# Patient Record
Sex: FEMALE | Race: WHITE | NOT HISPANIC OR LATINO | RURAL
[De-identification: names, ages, dates, MRNs, and addresses within clinical notes are randomized per-mention and may not be internally consistent; named-entity substitution may affect disease eponyms.]

---

## 2023-06-29 DIAGNOSIS — L57.0 ACTINIC KERATOSIS: Primary | ICD-10-CM

## 2023-08-15 ENCOUNTER — OFFICE VISIT (OUTPATIENT)
Dept: DERMATOLOGY | Facility: CLINIC | Age: 83
End: 2023-08-15
Payer: MEDICARE

## 2023-08-15 VITALS — BODY MASS INDEX: 19.83 KG/M2 | HEIGHT: 61 IN | WEIGHT: 105 LBS

## 2023-08-15 DIAGNOSIS — L57.0 ACTINIC KERATOSIS: ICD-10-CM

## 2023-08-15 DIAGNOSIS — L57.8 OTHER SKIN CHANGES DUE TO CHRONIC EXPOSURE TO NONIONIZING RADIATION: Primary | ICD-10-CM

## 2023-08-15 DIAGNOSIS — Z85.828 HISTORY OF NONMELANOMA SKIN CANCER: ICD-10-CM

## 2023-08-15 DIAGNOSIS — L82.1 SEBORRHEIC KERATOSES: ICD-10-CM

## 2023-08-15 DIAGNOSIS — D48.9 NEOPLASM OF UNCERTAIN BEHAVIOR: ICD-10-CM

## 2023-08-15 PROCEDURE — 88305 PATHOLOGY, DERMATOLOGY: ICD-10-PCS | Mod: 26,,, | Performed by: PATHOLOGY

## 2023-08-15 PROCEDURE — 17000 DESTRUCT PREMALG LESION: CPT | Mod: XS,,, | Performed by: DERMATOLOGY

## 2023-08-15 PROCEDURE — 88305 TISSUE EXAM BY PATHOLOGIST: CPT | Mod: TC,SUR | Performed by: DERMATOLOGY

## 2023-08-15 PROCEDURE — 17000 PR DESTRUCTION(LASER SURGERY,CRYOSURGERY,CHEMOSURGERY),PREMALIGNANT LESIONS,FIRST LESION: ICD-10-PCS | Mod: XS,,, | Performed by: DERMATOLOGY

## 2023-08-15 PROCEDURE — 17003 DESTRUCT PREMALG LES 2-14: CPT | Mod: ,,, | Performed by: DERMATOLOGY

## 2023-08-15 PROCEDURE — 88305 TISSUE EXAM BY PATHOLOGIST: CPT | Mod: 26,,, | Performed by: PATHOLOGY

## 2023-08-15 PROCEDURE — 99203 OFFICE O/P NEW LOW 30 MIN: CPT | Mod: 25,,, | Performed by: DERMATOLOGY

## 2023-08-15 PROCEDURE — 17003 DESTRUCTION, PREMALIGNANT LESIONS; SECOND THROUGH 14 LESIONS: ICD-10-PCS | Mod: ,,, | Performed by: DERMATOLOGY

## 2023-08-15 PROCEDURE — 99203 PR OFFICE/OUTPT VISIT, NEW, LEVL III, 30-44 MIN: ICD-10-PCS | Mod: 25,,, | Performed by: DERMATOLOGY

## 2023-08-15 PROCEDURE — 11102 TANGNTL BX SKIN SINGLE LES: CPT | Mod: ,,, | Performed by: DERMATOLOGY

## 2023-08-15 PROCEDURE — 11102 PR TANGENTIAL BIOPSY, SKIN, SINGLE LESION: ICD-10-PCS | Mod: ,,, | Performed by: DERMATOLOGY

## 2023-08-15 RX ORDER — LEVOTHYROXINE SODIUM 50 UG/1
TABLET ORAL
COMMUNITY
Start: 2023-07-27

## 2023-08-15 RX ORDER — FAMOTIDINE 20 MG/1
TABLET, FILM COATED ORAL
COMMUNITY
Start: 2023-07-27

## 2023-08-15 RX ORDER — SIMVASTATIN 20 MG/1
TABLET, FILM COATED ORAL
COMMUNITY
Start: 2023-07-27

## 2023-08-15 NOTE — PROGRESS NOTES
[facility]   Olya Ulloa MD    Patient Name: Heena Lai  Patient YOB: 1940   Date of Service: 8/15/23    CC: Full Skin Exam    HPI: Heena Lai is a 82 y.o. female presents today for a full skin exam.  Patient has been seen by a dermatologist in the past and dermatologic history includes SCC and BCC, October 2022. These excisions are the most recent but patient reports having extensive NMSC history. Patient is concerned today about a lesion located on the right upper anterior leg.  It has been present for 2 month(s). It has not been treated in the past.      History reviewed. No pertinent past medical history.  History reviewed. No pertinent surgical history.  Review of patient's allergies indicates:  No Known Allergies    Current Outpatient Medications:     famotidine (PEPCID) 20 MG tablet, Famotidine 20 MG Oral Tablet QTY: 180 tablet Days: 90 Refills: 2  Written: 07/27/23 Patient Instructions: twice a day, Disp: , Rfl:     levothyroxine (SYNTHROID) 50 MCG tablet, Synthroid 50 MCG Oral Tablet QTY: 90 tablet Days: 90 Refills: 2  Written: 07/27/23 Patient Instructions: once a day, Disp: , Rfl:     simvastatin (ZOCOR) 20 MG tablet, Simvastatin 20 MG Oral Tablet QTY: 90 tablet Days: 90 Refills: 2  Written: 07/27/23 Patient Instructions: once a day, Disp: , Rfl:     ROS: A focused review of systems was obtained and negative.     Exam: A full skin exam was performed including scalp, hair, face, neck, chest, back, abdomen, right arm, left arm, right hand, left hand, nails, right leg, and left leg.  All areas examined were normal expect as per below in assessment and plan.  General Appearance of the patient is well developed and well nourished.  Orientation: alert and oriented x 3.  Mood and affect: pleasant.    Assessment:   The primary encounter diagnosis was Other skin changes due to chronic exposure to nonionizing radiation. Diagnoses of Actinic keratosis, History of nonmelanoma skin  cancer, Neoplasm of uncertain behavior, and Seborrheic keratoses were also pertinent to this visit.    Plan:      Actinic Keratoses(L57.0)  - Erythematous patches and papules with hyperkeratotic scale distributed on the right arm, left arm, right leg, and left leg.    Plan: Counseling.  I counseled the patient regarding the following:  Skin Care: Sun protective clothing and broad spectrum sunscreen can prevent the formation of Actinic  Keratoses. AKs can resolve with cryotherapy, photodynamic therapy, imiquimod, topical 5-FU.  Expectations: Actinic Keratoses are precancerous proliferations that occur within sun damaged skin. If untreated,  a small subset of AKs can develop into Squamous Cell Carcinoma.  Contact Office if: If AKs fail to resolve despite treatment, or if you develop a side effect from therapy, such as  unbearable crusting, scabbing, redness and tenderness.    Plan: Liquid Nitrogen.  A total of 4 lesions were treated with liquid nitrogen for 2 freeze-thaw cycles lasting 5 seconds, located on the above locations.   The patient's consent was obtained including but not limited to risks of crusting, scabbing,  blistering, scarring, darker or lighter pigmentary change, recurrence, incomplete removal and infection.    Seborrheic Keratosis (L82.1)  - Stuck-on, warty, greasy brown papule with pseudo-horn cysts scattered on the trunk and extremities    Plan: Counseling.  I counseled the patient regarding the following:  Skin Care: Seborrheic Keratoses are benign. No treatment is necessary.  Expectations: Seborrheic Keratoses are benign warty growths. Patients get more of them as they age    Plan: Reassurance    Neoplasm of Uncertain Behavior (D48.5)  - eroded pink papule located on the right medial knee  Ddx includes: BCC vs SCC    Plan: Biopsy by Shave Method.  Location (1): right medial knee  Written consent was obtained and risks were reviewed including but not  limited to scarring, infection, bleeding,  scabbing, incomplete removal, nerve damage and allergy to anesthesia.  The area was prepped with Chloraprep. Local anesthesia was obtained with approximately 0.5cc of 1% lidocaine  with epinephrine. A biopsy by shave method to the level of the dermis (sent for H and E) was performed using  a Dermablade on the above location. Aluminum chloride was used for hemostasis. Following the biopsy  Petrolatum and a bandage were applied. Patient will be notified of biopsy results. However, patient instructed to  call the office if not contacted within 2 weeks.    Plan: Counseling.  I counseled the patient regarding the following:  Instructions: Neoplasms of Uncertain Behavior can be observed, biopsied or surgically removed depending on the  level of clinical suspicion.  Instructions: Neoplasms of Uncertain Behavior can be observed, biopsied or surgically removed depending on the  level of clinical suspicion.  Contact Office if: patient develops any new lesions that fail to heal, ulcerate or bleed.    History of non-melanoma skin cancer (Z85.828)  - Well healed scar with NER  Associated diagnosis: Medical surveillance following completed treatment    Plan: Monitoring.    Other Skin Changes Due to Chronic Exposure of Nonionizing Radiation (L57.8)    Plan: Monitoring.     Plan: Sunscreen Recommendations.  I recommended a broad spectrum sunscreen with a SPF of 30 or higher. I explained that SPF 30 sunscreens block approximately 97 percent of the  sun's harmful rays. Sunscreens should be applied at least 15 minutes prior to expected sun exposure and then every 2 hours after that as long as  sun exposure continues. If swimming or exercising sunscreen should be reapplied every 45 minutes to an hour after getting wet or sweating. One  ounce, or the equivalent of a shot glass full of sunscreen, is adequate to protect the skin not covered by a bathing suit. I also recommended a lip  balm with a sunscreen as well. Sun protective clothing  can be used in lieu of sunscreen but must be worn the entire time you are exposed to the  sun's rays.    Follow up in about 6 months (around 2/15/2024) for FSE.    Olya Ulloa MD

## 2023-08-15 NOTE — PATIENT INSTRUCTIONS
Cryotherapy  There will likely be a blister.   Clean the area daily with dial antibacterial soap and water.   Apply vaseline as needed.   The area will take 1-2 weeks to heal.    Biopsy Site Care  Starting tomorrow you may shower and wash the area with dial antibacterial soap  Pat dry and apply vaseline and a bandaid  Perform this routine for three days  The area will be irritated, sore, slightly red, and may itch, sting, or burn  Do not apply make-up to the area until it is healed  There will be a scar  The area will take 1-2 weeks to heal  No soaking in the tub or hot tub for one week

## 2023-08-17 LAB
ESTROGEN SERPL-MCNC: NORMAL PG/ML
INSULIN SERPL-ACNC: NORMAL U[IU]/ML
LAB AP GROSS DESCRIPTION: NORMAL
LAB AP LABORATORY NOTES: NORMAL
LAB AP SPEC A DDX: NORMAL
LAB AP SPEC A MORPHOLOGY: NORMAL
LAB AP SPEC A PROCEDURE: NORMAL
T3RU NFR SERPL: NORMAL %

## 2023-09-08 ENCOUNTER — PROCEDURE VISIT (OUTPATIENT)
Dept: DERMATOLOGY | Facility: CLINIC | Age: 83
End: 2023-09-08
Payer: MEDICARE

## 2023-09-08 VITALS — SYSTOLIC BLOOD PRESSURE: 140 MMHG | DIASTOLIC BLOOD PRESSURE: 83 MMHG | HEART RATE: 81 BPM

## 2023-09-08 DIAGNOSIS — Z91.89 AT HIGH RISK FOR INFECTION AT SURGICAL SITE: ICD-10-CM

## 2023-09-08 DIAGNOSIS — C44.712 BASAL CELL CARCINOMA (BCC) OF RIGHT KNEE: Primary | ICD-10-CM

## 2023-09-08 PROCEDURE — 12032 PR LAYR CLOS WND TRUNK,ARM,LEG 2.6-7.5 CM: ICD-10-PCS | Mod: 51,,, | Performed by: DERMATOLOGY

## 2023-09-08 PROCEDURE — 99499 NO LOS: ICD-10-PCS | Mod: ,,, | Performed by: DERMATOLOGY

## 2023-09-08 PROCEDURE — 88305 TISSUE EXAM BY PATHOLOGIST: CPT | Mod: 26,,, | Performed by: PATHOLOGY

## 2023-09-08 PROCEDURE — 99499 UNLISTED E&M SERVICE: CPT | Mod: ,,, | Performed by: DERMATOLOGY

## 2023-09-08 PROCEDURE — 11602 PR EXC SKIN MALIG 1.1-2 CM TRUNK,ARM,LEG: ICD-10-PCS | Mod: ,,, | Performed by: DERMATOLOGY

## 2023-09-08 PROCEDURE — 88305 TISSUE EXAM BY PATHOLOGIST: CPT | Mod: TC,SUR | Performed by: DERMATOLOGY

## 2023-09-08 PROCEDURE — 11602 EXC TR-EXT MAL+MARG 1.1-2 CM: CPT | Mod: ,,, | Performed by: DERMATOLOGY

## 2023-09-08 PROCEDURE — 12032 INTMD RPR S/A/T/EXT 2.6-7.5: CPT | Mod: 51,,, | Performed by: DERMATOLOGY

## 2023-09-08 PROCEDURE — 88305 PATHOLOGY, DERMATOLOGY: ICD-10-PCS | Mod: 26,,, | Performed by: PATHOLOGY

## 2023-09-08 RX ORDER — MUPIROCIN 20 MG/G
OINTMENT TOPICAL
Qty: 30 G | Refills: 3 | Status: SHIPPED | OUTPATIENT
Start: 2023-09-08

## 2023-09-08 RX ORDER — CEPHALEXIN 500 MG/1
500 CAPSULE ORAL 2 TIMES DAILY
Qty: 10 CAPSULE | Refills: 0 | Status: SHIPPED | OUTPATIENT
Start: 2023-09-08 | End: 2023-09-13

## 2023-09-08 NOTE — PROGRESS NOTES
[facility]   Olya Ulloa MD    Patient Name: Heena Lai  Patient YOB: 1940   Date of Service: 9/8/23    CC: Excision    HPI: Heena Lai is a 82 y.o. female here today for excision of BCC, located on the right medial knee.      No past medical history on file.  No past surgical history on file.  Review of patient's allergies indicates:  No Known Allergies    Current Outpatient Medications:     famotidine (PEPCID) 20 MG tablet, Famotidine 20 MG Oral Tablet QTY: 180 tablet Days: 90 Refills: 2  Written: 07/27/23 Patient Instructions: twice a day, Disp: , Rfl:     levothyroxine (SYNTHROID) 50 MCG tablet, Synthroid 50 MCG Oral Tablet QTY: 90 tablet Days: 90 Refills: 2  Written: 07/27/23 Patient Instructions: once a day, Disp: , Rfl:     simvastatin (ZOCOR) 20 MG tablet, Simvastatin 20 MG Oral Tablet QTY: 90 tablet Days: 90 Refills: 2  Written: 07/27/23 Patient Instructions: once a day, Disp: , Rfl:     ROS: A focused review of systems was obtained and negative.     Exam: A focused skin exam was performed and the biopsy site was identified.  General Appearance of the patient is well developed and well nourished.  Orientation: alert and oriented x 3.  Mood and affect: pleasant.    There were no vitals filed for this visit.    Assessment:   The encounter diagnosis was Basal cell carcinoma (BCC) of right knee.    Plan:  Excision  Accession Number: [id number]  Biopsy Photograph Reviewed: Yes    Procedure Note    Location (A): right medial knee  Preop Size: ***  Margin: 0.4 cm  Total Excised Diameter: *** cm  Procedure: Excision - Elliptical  Anesthesia: local infiltration-1% lidocaine with epinephrine (12 cc)  Estimated Blood Loss: minimal  Complications: none    Repair Type: Intermediate  Repair Length: ***    Consent was obtained from the patient. The risks and benefits to therapy were discussed in detail. Specifically, the risks of infection, scarring, bleeding, prolonged wound healing,  incomplete removal, allergy to anesthesia, nerve injury and recurrence were addressed. Prior to the procedure, the treatment site was clearly identified and confirmed by the patient. All components of Universal Protocol/PAUSE Rule completed.    Procedure: The patient was placed in a comfortable position exposing the surgical site. The area was prepped with Hibiclens and draped in the usual fashion. An elliptical excision was performed, on the above listed location The margin was drawn around the clinically apparent lesion. An elliptical shape was then drawn on the skin incorporating the lesion and margins. Incisions were then made along these lines to the appropriate tissue plane and the lesion was extirpated. A 15 blade was used. The lesion was excised to the layer of the adipose tissue, removed and sent to pathology for processing and histologic evaluation.    Intermediate layered repair was performed because closure of the subcutaneous tissue and superficial non-muscular fascia was required, because damaged skin surrounding defect made closure difficult, because extensive undermining required, and because Burow's triangles were required. Undermining was performed with blunt dissection. Hemostasis was achieved with electrocautery. The subcutaneous tissue and dermis were closed with {Suture Type:77469} (interrupted). Epidermal closure was achieved with {Suture Type:32953}(interrupted). Petrolatum + dry sterile dressing were applied. I reviewed with the patient in detail post-care instructions. Patient is not to engage in any heavy lifting, exercise, or swimming for the next 14 days. Should the patient develop any fevers, chills, bleeding, severe pain patient will contact the office immediately. Suture removal in 10.       No follow-ups on file.    Olya Ulloa MD

## 2023-09-08 NOTE — PROGRESS NOTES
[facility]   Olya Ulloa MD    Patient Name: Heena Lai  Patient YOB: 1940   Date of Service: 9/8/23    CC: Excision    HPI: Heena Lai is a 82 y.o. female here today for excision of BCC, located on the right medial knee.      History reviewed. No pertinent past medical history.  History reviewed. No pertinent surgical history.  Review of patient's allergies indicates:  No Known Allergies    Current Outpatient Medications:     cephALEXin (KEFLEX) 500 MG capsule, Take 1 capsule (500 mg total) by mouth 2 (two) times a day. for 5 days, Disp: 10 capsule, Rfl: 0    famotidine (PEPCID) 20 MG tablet, Famotidine 20 MG Oral Tablet QTY: 180 tablet Days: 90 Refills: 2  Written: 07/27/23 Patient Instructions: twice a day, Disp: , Rfl:     levothyroxine (SYNTHROID) 50 MCG tablet, Synthroid 50 MCG Oral Tablet QTY: 90 tablet Days: 90 Refills: 2  Written: 07/27/23 Patient Instructions: once a day, Disp: , Rfl:     mupirocin (BACTROBAN) 2 % ointment, Apply to wound as directed, Disp: 30 g, Rfl: 3    simvastatin (ZOCOR) 20 MG tablet, Simvastatin 20 MG Oral Tablet QTY: 90 tablet Days: 90 Refills: 2  Written: 07/27/23 Patient Instructions: once a day, Disp: , Rfl:     ROS: A focused review of systems was obtained and negative.     Exam: A focused skin exam was performed and the biopsy site was identified.  General Appearance of the patient is well developed and well nourished.  Orientation: alert and oriented x 3.  Mood and affect: pleasant.    Vitals:    09/08/23 1023   BP: (!) 140/83   Pulse: 81       Assessment:   The primary encounter diagnosis was Basal cell carcinoma (BCC) of right knee. A diagnosis of At high risk for infection at surgical site was also pertinent to this visit.    Plan:  Excision  Accession Number: [id number]  Biopsy Photograph Reviewed: Yes    Procedure Note    Location (A): right medial knee  Preop Size: 0.9 x 0.9 cm  Margin: 0.4 cm  Total Excised Diameter: 1.7 x 1.7   cm  Procedure: Excision - Elliptical  Anesthesia: local infiltration-1% lidocaine with epinephrine (12 cc)  Estimated Blood Loss: minimal  Complications: none    Repair Type: Intermediate  Repair Length: 4.5 cm    Consent was obtained from the patient. The risks and benefits to therapy were discussed in detail. Specifically, the risks of infection, scarring, bleeding, prolonged wound healing, incomplete removal, allergy to anesthesia, nerve injury and recurrence were addressed. Prior to the procedure, the treatment site was clearly identified and confirmed by the patient. All components of Universal Protocol/PAUSE Rule completed.    Procedure: The patient was placed in a comfortable position exposing the surgical site. The area was prepped with Hibiclens and draped in the usual fashion. An elliptical excision was performed, on the above listed location The margin was drawn around the clinically apparent lesion. An elliptical shape was then drawn on the skin incorporating the lesion and margins. Incisions were then made along these lines to the appropriate tissue plane and the lesion was extirpated. A 15 blade was used. The lesion was excised to the layer of the adipose tissue, removed and sent to pathology for processing and histologic evaluation.    Intermediate layered repair was performed because closure of the subcutaneous tissue and superficial non-muscular fascia was required, because damaged skin surrounding defect made closure difficult, because extensive undermining required, and because Burow's triangles were required. Undermining was performed with blunt dissection. Hemostasis was achieved with electrocautery. The subcutaneous tissue and dermis were closed with 3-0 Vicryl (interrupted). Epidermal closure was achieved with 3-0 Prolene(interrupted). Petrolatum + dry sterile dressing were applied. I reviewed with the patient in detail post-care instructions. Patient is not to engage in any heavy lifting,  exercise, or swimming for the next 14 days. Should the patient develop any fevers, chills, bleeding, severe pain patient will contact the office immediately. Suture removal in 14.    Medications Ordered This Encounter   Medications    cephALEXin (KEFLEX) 500 MG capsule     Sig: Take 1 capsule (500 mg total) by mouth 2 (two) times a day. for 5 days     Dispense:  10 capsule     Refill:  0    mupirocin (BACTROBAN) 2 % ointment     Sig: Apply to wound as directed     Dispense:  30 g     Refill:  3         Follow up in about 2 weeks (around 9/22/2023) for SR .    Olya Ulloa MD

## 2023-09-22 ENCOUNTER — CLINICAL SUPPORT (OUTPATIENT)
Dept: DERMATOLOGY | Facility: CLINIC | Age: 83
End: 2023-09-22
Payer: MEDICARE

## 2023-09-22 DIAGNOSIS — Z48.02 ENCOUNTER FOR REMOVAL OF SUTURES: Primary | ICD-10-CM

## 2023-09-22 PROCEDURE — 87070 CULTURE OTHR SPECIMN AEROBIC: CPT | Mod: ,,, | Performed by: CLINICAL MEDICAL LABORATORY

## 2023-09-22 PROCEDURE — 87070 CULTURE, WOUND: ICD-10-PCS | Mod: ,,, | Performed by: CLINICAL MEDICAL LABORATORY

## 2023-09-22 NOTE — PROGRESS NOTES
[facility]   Olya Ulloa MD    Patient Name: Heena Lai  Patient YOB: 1940   Date of Service: 9/22/23    CC: Suture removal    HPI: Heena Lai is a 82 y.o. female here today for suture removal on the right medial knee.  The area is healing well.  Patient denies fever, chills, puss, or redness to the area.    No past medical history on file.  No past surgical history on file.  Review of patient's allergies indicates:  No Known Allergies    Current Outpatient Medications:     famotidine (PEPCID) 20 MG tablet, Famotidine 20 MG Oral Tablet QTY: 180 tablet Days: 90 Refills: 2  Written: 07/27/23 Patient Instructions: twice a day, Disp: , Rfl:     levothyroxine (SYNTHROID) 50 MCG tablet, Synthroid 50 MCG Oral Tablet QTY: 90 tablet Days: 90 Refills: 2  Written: 07/27/23 Patient Instructions: once a day, Disp: , Rfl:     mupirocin (BACTROBAN) 2 % ointment, Apply to wound as directed, Disp: 30 g, Rfl: 3    simvastatin (ZOCOR) 20 MG tablet, Simvastatin 20 MG Oral Tablet QTY: 90 tablet Days: 90 Refills: 2  Written: 07/27/23 Patient Instructions: once a day, Disp: , Rfl:       Exam: A focused skin exam was performed. All areas examined were normal except as mentioned in the assessment and plan below.  General Appearance of the patient is well developed and well nourished.  Orientation: alert and oriented x 3.  Mood and affect: pleasant.    Assessment:   The encounter diagnosis was Encounter for removal of sutures.    Plan:   Suture Removal (Global Period)  Body Locations: Right medial knee  I reviewed the pathology results with the patient in detail.  The examination of the site was clean, dry and intact. Sutures were removed.  Vaseline applied.    I counseled the patient regarding the following:  Expectations: Sutured wounds tend to have 50% of the strength of normal skin at the time of suture removal. Try avoiding rigorous exercise or lifting greater than 10 pounds.  Contact office if: you  develop pain, redness, tenderness or pus at the surgical site.    A culture was obtained from the area           Follow up if symptoms worsen or fail to improve, for FSE already scheduled.    Emy Bearden RN

## 2023-09-24 LAB — MICROORGANISM SPEC CULT: NORMAL
